# Patient Record
Sex: MALE | Race: BLACK OR AFRICAN AMERICAN | Employment: STUDENT | URBAN - METROPOLITAN AREA
[De-identification: names, ages, dates, MRNs, and addresses within clinical notes are randomized per-mention and may not be internally consistent; named-entity substitution may affect disease eponyms.]

---
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
## 2025-02-05 NOTE — LETTER
Bed: B04  Expected date:   Expected time:   Means of arrival:   Comments:  ik1   NOTIFICATION RETURN TO WORK / SCHOOL 
 
2/6/2017 4:57 PM 
 
Mr. [address] [phone] To Whom It May Concern: 
 
Felix Persaud [facility]. Odilia Urias; Father will return to work/school on: 2/7/2017 If there are questions or concerns please have the patient contact our office. Sincerely, Noe Alonso MD